# Patient Record
(demographics unavailable — no encounter records)

---

## 2025-01-22 NOTE — HISTORY OF PRESENT ILLNESS
[FreeTextEntry1] : Patient is a 56-year-old woman here to follow-up for type 2 diabetes, obesity, hyperlipidemia, and hypertension Regarding type 2 diabetes diagnosed in 2014.  Patient was treated with metformin  mg once daily.   She underwent gastric sleeve surgery March 2022.  She lost about 25 pounds but she has not been able to lose any additional weight afterwards.  She also reports recent weight gain.  She denies excessive caloric intake.    [place] works from 1 to 3 AM in the morning.   Current DM Meds: -Basaglar 30 units qhs (adherent) -Humalog 10 units qac (adherent) -Jardiance 10 mg daily (adherent) -Ozempic 1 mg weekly (resumed in 9/2024, dose increased around late 10/2024, tolerating well, has appetite suppression)   Previous DM Meds: -metFORMIN HCl  MG Oral Tablet Extended Release 24 Hour; 2 tablets twice daily (stopped 9/2024 because patient states it was not working)  SMBG (Contour Next): also has [employer] ariella but does not know how to pair sensors fasting- 140, 141, 176, 177, 178, 134, 178, 151, 122, 192, 138, 153, 112 (used to be over 200 fasting before restarting Ozempic) bedtime- 168 Denies hypoglycemia.   Ophtho: last visit 12/2024, denies DR Podiatry: last visit 10/2024, no active feet issues Renal: eGFR 109 (9/16/2024). UACR 8 (9/16/2024). No  infections.  Diet: 2-3 meals a day, small portions breakfast- coffee with milk no sugar (does give Humalog) lunch- salad, or 2 spoons white rice, meat dinner- crackers with cheese, sometimes leftovers, sometimes any type of yogurt snacks- none drinks- water mostly, no soda or juice Exercise: not much, sometimes biking 3x/week for 20-30 mins  Weight: 230 lbs --> 228 lbs.   HLD: On atorvastatin 20 mg qd. Also on aspirin.  HTN: On atenolol, lisinopril. Off chlorthalidone.

## 2025-01-22 NOTE — PHYSICAL EXAM
[Alert] : alert [No Acute Distress] : no acute distress [Normal Sclera/Conjunctiva] : normal sclera/conjunctiva [No Proptosis] : no proptosis [Thyroid Not Enlarged] : the thyroid was not enlarged [No Respiratory Distress] : no respiratory distress [Clear to Auscultation] : lungs were clear to auscultation bilaterally [Normal S1, S2] : normal S1 and S2 [Normal Rate] : heart rate was normal [Regular Rhythm] : with a regular rhythm [No Edema] : no peripheral edema [Not Tender] : non-tender [Soft] : abdomen soft [No Spinal Tenderness] : no spinal tenderness [Normal Gait] : normal gait [No Rash] : no rash [No Tremors] : no tremors [Oriented x3] : oriented to person, place, and time

## 2025-01-22 NOTE — ASSESSMENT
[FreeTextEntry1] : 56 year-old female with uncontrolled type 2 diabetes, HTN, HLD, here for follow up.    1.  Type 2 diabetes No POCT A1c kits today in the office, will check serum A1c. Status post gastric sleeve surgery March 2022 Recommend to continue with Basaglar 30 units at bedtime Recommend to decrease Humalog to 8 units tid with meals once we increase Ozempic. Increase Ozempic to 2 mg once weekly (once she is finished with current 1 mg pen).  Continue Jardiance 10mg once daily  Off Metformin for now as patient states it's not working well for her.  Discussed dietary modifications and increased exercise. Resume CGM ([employer]ex[employer] G7 ariella).  Assisted with CGM teach and sensor placement today. Call if persistent highs or lows.  Confirm lows with FS. She is up to date with the ophthalmologist. Foot exam within normal limits 02/27/2024   2.  Blood pressure management.   BP goal <130/80 Patient is currently taking lisinopril 5 mg once daily and atenolol.  3.  Hyperlipidemia Continue with atorvastatin 20 mg once daily. LDL 65, HDL 53,  (9/16/2024).  4.  Status post gastric sleeve surgery Continue to follow-up with bariatric surgery.  Patient requesting labs today. F/u in 3 months with Dr. Calix.  Sharee Velasco NP ([facility])